# Patient Record
Sex: MALE | URBAN - METROPOLITAN AREA
[De-identification: names, ages, dates, MRNs, and addresses within clinical notes are randomized per-mention and may not be internally consistent; named-entity substitution may affect disease eponyms.]

---

## 2022-03-22 ENCOUNTER — APPOINTMENT (RX ONLY)
Dept: URBAN - METROPOLITAN AREA CLINIC 374 | Facility: CLINIC | Age: 42
Setting detail: DERMATOLOGY
End: 2022-03-22

## 2022-03-22 DIAGNOSIS — L82.1 OTHER SEBORRHEIC KERATOSIS: ICD-10-CM

## 2022-03-22 PROCEDURE — ? COUNSELING

## 2022-03-22 PROCEDURE — 99202 OFFICE O/P NEW SF 15 MIN: CPT

## 2022-03-22 PROCEDURE — ? ADDITIONAL NOTES

## 2022-03-22 ASSESSMENT — LOCATION SIMPLE DESCRIPTION DERM: LOCATION SIMPLE: LEFT CHEEK

## 2022-03-22 ASSESSMENT — LOCATION ZONE DERM: LOCATION ZONE: FACE

## 2022-03-22 ASSESSMENT — LOCATION DETAILED DESCRIPTION DERM: LOCATION DETAILED: LEFT SUPERIOR CENTRAL MALAR CHEEK

## 2024-05-13 ENCOUNTER — TELEPHONE (OUTPATIENT)
Dept: PSYCHIATRY | Facility: CLINIC | Age: 44
End: 2024-05-13

## 2024-05-13 NOTE — TELEPHONE ENCOUNTER
Spoke with patient in regards to med mgmt services. Patient was at [facility] but insurance is no longer accepted there. Writer sent a message to Chelsy to give patient a call.

## 2024-05-14 ENCOUNTER — TELEPHONE (OUTPATIENT)
Dept: PSYCHIATRY | Facility: CLINIC | Age: 44
End: 2024-05-14

## 2024-05-14 NOTE — TELEPHONE ENCOUNTER
"Behavioral Health Outpatient Intake Questions    Referred By   : SELF    Please advise interviewee that they need to answer all questions truthfully to allow for best care, and any misrepresentations of information may affect their ability to be seen at this clinic   => Was this discussed? Yes     If Minor Child (under age 18)    Who is/are the legal guardian(s) of the child?     Is there a custody agreement? No     If \"YES\"- Custody orders must be obtained prior to scheduling the first appointment  In addition, Consent to Treatment must be signed by all legal guardians prior to scheduling the first appointment    If \"NO\"- Consent to Treatment must be signed by all legal guardians prior to scheduling the first appointment    Behavioral Health Outpatient Intake History -     Presenting Problem (in patient's own words): Med triptap services.  [facility] no longer takes his insurance.    Are there any communication barriers for this patient?     No                                               If yes, please describe barriers:   If there is a unique situation, please refer to Danny Lincoln/Katie Carl for final determination.    Are you taking any psychiatric medications? Yes     If \"YES\" -What are they Zoloft, Seroquel, Klonopin     If \"YES\" -Who prescribes?     Has the Patient previously received outpatient Talk Therapy or Medication Management from [facility]  Yes        If \"YES\"- When, Where and with Whom? Was seen a E[facility] last year        If \"NO\" -Has Patient received these services elsewhere?       If \"YES\" -When, Where, and with Whom?    Has the Patient abused alcohol or other substances in the last 6 months ? No       If \"YES\" -What substance, How much, How often?     If illegal substance: Refer to [facility] (for GERI) or [facility].   If Alcohol in excess of 10 drinks per week:  Refer to [facility] (for GERI) or [facility]    Legal History-     Is this treatment court ordered? No   If \"yes \"send to " ":  Talk Therapy : Send to Danny Carl for final determination   Med Management: Send to Dr Ghosh for final determination     Has the Patient been convicted of a felony?  No   If \"Yes\" send to -When, What?  Talk Therapy : Send to Danny Carl for final determination   Med Management: Send to Dr Ghosh for final determination     ACCEPTED as a patient Yes  If \"Yes\" Appointment Date: 5/23/24     Referred Elsewhere? No  If “Yes” - (Where? Ex: [facility], [facility], [facility], [facility], etc.)      Name of Insurance Co:  [employer] care  Insurance ID#   [id number]   Insurance Phone #  If ins is primary or secondary?  If patient is a minor, parents information such as Name, D.O.B of guarantor.  "

## 2024-05-15 RX ORDER — CITALOPRAM 40 MG/1
40 TABLET ORAL DAILY
COMMUNITY
Start: 2024-03-20

## 2024-05-15 RX ORDER — QUETIAPINE FUMARATE 50 MG/1
50-100 TABLET, FILM COATED ORAL
COMMUNITY
Start: 2024-04-18

## 2024-05-15 RX ORDER — CLONAZEPAM 0.5 MG/1
TABLET ORAL
COMMUNITY
Start: 2024-04-11

## 2024-05-15 NOTE — PSYCH
"PSYCHIATRIC EVALUATION     [facility]    This note was not shared with the patient due to reasonable likelihood of causing patient harm         Name and Date of Birth:  Delmer Peterson 43 y.o. 1980    Date of Visit: 5/23/2024    Reason for visit: Establish care for medication management    HPI     Delmer is a 43 y.o. male with a history of depression and anxiety who presents for psychiatric evaluation due to  previous practice no longer taking his insurance . Currently being observed on Seroquel  mg as needed at bedtime, Klonopin 0.5 mg daily as needed, and Celexa 40 mg daily.  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  Currently not connected to any outpatient therapy, however is interested in being placed on the wait list at [facility].  No additional services at this time. Delmer was personally seen and evaluated today at the [facility] outpatient clinic for an initial psychiatric evaluation for medication management.        Delmer reports compliance with medications and denies any side effects at this time.He states he only takes the Klonopin as needed which is not often.  He reports typically taking 100 mg of the Seroquel at night. Reports he has been on the same medication regimen for approximately 8 to 10 years with good benefit. Delmer reports his mood as \"good\", but is a little anxious due to the appointment.  He states his sleep is great averaging 9 hours of sleep per night. He has been making sleep more of a priority. His energy levels and motivation are both adequate.  His appetite is good and he eats 3 meals per day. Denies any disordered eating.    Delmer endorses anxiety that started approximately at the age of 10 when his mom was institutionalized for mental illness. He started having panic attacks when he was drinking 18 years ago. At 24 years old he became sober and started seeing a " "psychiatrist. Today Delmer rates his anxiety a 3/10 on the severity scale. Feels his anxiety is mostly situational and well-controlled.He reports his last panic attack was approximately 2 years ago while giving blood as his panic seems to increase in medical settings.  He denies any panic attacks since then. He denies any suicidal thoughts, plan, or intent. Denies HI. Delmer denies any AH/VH. There are guns in the home but they are locked up. GAD_7 score 4    Delmer endorses depressive symptoms approximately starting around the age of 4 or 15 when he began to drink.  He reports his depression today is 0-1/10 on the severity scale.  He denies anhedonia.  Denies feelings of worthlessness, hopelessness, or guilt. Adamantly denies any SI.  Delmer denies aggression, mood swings, irritability, or any elevated moods.  He reports crying spells that occurred approximately last year around February when he had a \"breakdown\". Stating he was \"burning the candle at both ends\" with work and life. He was getting very little sleep which is why he now prioritizes his sleep.  He feels his anxiety and depression have been well-controlled. PHQ-9 score 2. Delmer was never formally diagnosed with ADHD and has no concerns at this time.  Denies any symptoms suggestive of OCD or PTSD. No medication changes at this time.    HPI ROS Appetite Changes and Sleep: normal sleep, adequate number of sleep hours, normal appetite, adequate appetite, normal energy level, adequate energy level    Psychiatric Review Of Systems:    Sleep changes: no  Appetite changes: no  Weight changes: no  Energy/anergy: no change, adequate  Interest/pleasure/anhedonia:  Still enjoys things  Somatic symptoms: no  Anxiety/panic: yes, in past.  Well-controlled today  Sanam: no  Guilty/hopeless: no  Self injurious behavior/risky behavior: no  Suicidal ideation: no  Homicidal ideation: no  Auditory hallucinations: no  Visual hallucinations: no  Other hallucinations: " "no  Delusional thinking: no  Eating disorder history: no  Obsessive/compulsive symptoms: no    Review Of Systems:    Mood Normal, Anxiety, and \"good\"   Behavior Normal    Thought Content Normal   General Normal    Personality Normal   Other Psych Symptoms Normal   Constitutional negative   ENT negative   Cardiovascular negative   Respiratory negative   Gastrointestinal negative   Genitourinary negative   Musculoskeletal negative   Integumentary negative   Neurological negative   Endocrine negative   Other Symptoms none     Allergies: KNDA     Past Surgical History:  Denies     Past Medical History:   Denies  Seizure history- No    Past Psychiatric History:   Past Inpatient Psychiatric Treatment:   No history of past inpatient psychiatric admissions  Past Outpatient Psychiatric Treatment:    [facility]  Therapy through [facility]  Past Suicide Attempts: no  Past Violent Behavior: no  Past Psychiatric Medication Trials: Zoloft and Xanax    Family Psychiatric History:   Mom- Bipolar     Family History:  History reviewed. No pertinent family history.    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      Social History     Substance and Sexual Activity   Drug Use Not on file     Social History     Socioeconomic History    Marital status: /Civil Union     Spouse name: Not on file    Number of children: Not on file    Years of education: Not on file    Highest education level: Not on file   Occupational History    Not on file   Tobacco Use    Smoking status: Not on file    Smokeless tobacco: Not on file   Substance and Sexual Activity    Alcohol use: Not on file    Drug use: Not on file    Sexual activity: Not on file   Other Topics Concern    Not on file   Social History Narrative    Not on file     Social Determinants of " "Health     Financial Resource Strain: Not on file   Food Insecurity: Not on file   Transportation Needs: Not on file   Physical Activity: Not on file   Stress: Not on file   Social Connections: Not on file   Intimate Partner Violence: Not on file   Housing Stability: Not on file     Social History     Social History Narrative    Not on file       Traumatic History:   Abuse: Denies  Other Traumatic Events: Denies    History Review:    The following portions of the patient's history were reviewed and updated as appropriate: allergies, current medications, past family history, past medical history, past social history, past surgical history, and problem list.     OBJECTIVE:     Mental Status Evaluation:    Appearance age appropriate, casually dressed, dressed appropriately, adequate grooming   Behavior normal, pleasant, cooperative, calm   Speech normal rate and volume   Mood normal, \"good\"   Affect normal range and intensity   Thought Processes organized, logical, coherent, goal directed   Associations intact associations   Thought Content normal   Perceptual Disturbances: none   Abnormal Thoughts  Risk Potential Suicidal ideation - None  Homicidal ideation - None  Potential for aggression - No   Orientation oriented to person, place, time/date, and situation   Memory recent and remote memory grossly intact   Cosciousness alert and awake   Attention Span attention span and concentration are age appropriate   Intellect Appears to be of Average Intelligence   Insight age appropriate and good   Judgement age appropriate and good   Muscle Strength and  Gait normal muscle strength and normal muscle tone, normal gait and normal balance   Language Normal   Fund of Knowledge Normal   Pain none   Pain Scale N/A       Laboratory Results: No results found for this or any previous visit.    Assessment/Plan:     Impression:  Anxiety  Depression, unspecified       Continue Seroquel 50 to 100 mg daily at bedtime as needed for " mood/sleep  Continue Klonopin 0.5 mg daily as needed for anxiety  Continue Celexa 40 mg daily for anxiety and depression  Interested in being placed on the outpatient therapy wait list for [facility].-Will collaborate with navigator  Medical follow up with PCP as needed  Aware of 24 hour and weekend coverage for urgent situations accessed by calling [facility] main practice number  Follow up in 3 months       Diagnoses:     Diagnoses and all orders for this visit:    Anxiety    Depression, unspecified depression type    Mood disorder (HCC)    Other orders  -     citalopram (CeleXA) 40 mg tablet; Take 40 mg by mouth daily  -     clonazePAM (KlonoPIN) 0.5 mg tablet; TAKE 1 TABLET BY ORAL ROUTE PER DAILY AS NEEDED  -     QUEtiapine (SEROquel) 50 mg tablet; Take  mg by mouth daily at bedtime as needed          Treatment Recommendations/Precautions:    Risks/Benefits      Risks, Benefits And Possible Side Effects Of Medications:    Risks, benefits, and possible side effects of medications explained to patient and patient verbalizes understanding and agreement for treatment.    Controlled Medication Discussion:     Delmer has been filling controlled prescriptions on time as prescribed according to [place] Prescription Drug Monitoring Program  Discussed with Delmer the risks of sedation, respiratory depression, impairment of ability to drive and potential for abuse and addiction related to treatment with benzodiazepine medications. He understands risk of treatment with benzodiazepine medications, agrees to not drive if feels impaired and agrees to take medications as prescribed    Treatment Plan: Treatment plan was completed during today's visit. Patient verbally consented and signed form while in the office today. Next treatment plan due 11/23/2024.     Visit Time     Visit Start Time: 09:00 AM  Visit Stop Time: 09:20 AM  Total Visit Duration: 20 minutes    Tiff Ledesma  GUIDO Lovell  05/23/24

## 2024-05-16 NOTE — BH TREATMENT PLAN
TREATMENT PLAN (Medication Management Only)        [facility]    Name and Date of Birth:  Delmer Peterson 43 y.o. 1980  Date of Treatment Plan: May 23, 2024  Diagnosis/Diagnoses:  No diagnosis found.  Strengths/Personal Resources for Self-Care: taking medications as prescribed, ability to adapt to life changes, ability to communicate needs, ability to communicate well, ability to listen, ability to reason, ability to understand psychiatric illness.  Area/Areas of need (in own words): anxiety, depression  1. Long Term Goal: maintain control of depression.  Target Date: 11/23/2024  Person/Persons responsible for completion of goal: Delmer  2.  Short Term Objective (s) - How will we reach this goal?:   A. Provider new recommended medication/dosage changes and/or continue medication(s): continue current medications as prescribed.  B. Keep regularly scheduled psychiatric appointments  C. Maintain adherence to psychotropic medication regimen   D. Exercise daily (at least 30 mins)  E. Maintain appropriate dietary intake  F. Practice adequate sleep hygiene    Target Date: 11/23/2024  Person/Persons Responsible for Completion of Goal: Delmer  Progress Towards Goals: continuing treatment  Treatment Modality: medication management every 4 weeks  Review due 180 days from date of this plan:  11/23/2024  Expected length of service: ongoing treatment  My Physician/PA/NP and I have developed this plan together and I agree to work on the goals and objectives. I understand the treatment goals that were developed for my treatment.

## 2024-05-23 ENCOUNTER — OFFICE VISIT (OUTPATIENT)
Dept: PSYCHIATRY | Facility: CLINIC | Age: 44
End: 2024-05-23
Payer: COMMERCIAL

## 2024-05-23 DIAGNOSIS — F41.9 ANXIETY: Primary | ICD-10-CM

## 2024-05-23 DIAGNOSIS — F32.A DEPRESSION, UNSPECIFIED DEPRESSION TYPE: ICD-10-CM

## 2024-05-23 DIAGNOSIS — F39 MOOD DISORDER (HCC): ICD-10-CM

## 2024-05-23 PROCEDURE — 90792 PSYCH DIAG EVAL W/MED SRVCS: CPT

## 2024-05-30 ENCOUNTER — TELEPHONE (OUTPATIENT)
Dept: PSYCHIATRY | Facility: CLINIC | Age: 44
End: 2024-05-30

## 2024-07-03 ENCOUNTER — TELEPHONE (OUTPATIENT)
Dept: PSYCHIATRY | Facility: CLINIC | Age: 44
End: 2024-07-03

## 2024-07-03 DIAGNOSIS — F41.9 ANXIETY: Primary | ICD-10-CM

## 2024-07-03 RX ORDER — CLONAZEPAM 0.5 MG/1
0.5 TABLET ORAL DAILY PRN
Qty: 30 TABLET | Refills: 0 | Status: SHIPPED | OUTPATIENT
Start: 2024-07-03

## 2024-07-03 NOTE — TELEPHONE ENCOUNTER
Pt calling for RF of clonazepam. Last fill per PDMP: 4/11. No current active script on file. Forwarding to provider for review. Patient scheduled for appt on 8/22.

## 2024-07-03 NOTE — PROGRESS NOTES
Delmer called the clinic today and appropriately requested refill of controlled psychotropic medications Klonopin 0.5 mg . Review of PDMP website reveals no concerns for abuse or misuse. As such, will refill script today for 30-days.

## 2024-07-19 DIAGNOSIS — F41.9 ANXIETY: ICD-10-CM

## 2024-07-19 DIAGNOSIS — F32.A DEPRESSION, UNSPECIFIED DEPRESSION TYPE: Primary | ICD-10-CM

## 2024-07-19 NOTE — TELEPHONE ENCOUNTER
Reason for call:   [x] Refill   [] Prior Auth  [] Other:     Office:   [] PCP/Provider -   [x] Specialty/Provider - Tiff Pritchard    Medication: Quetiapine      Dose/Frequency: 50 mg 1 - 2 tabs HS PRN    Quantity: 60    Ph[facility]acy: [facility] [place] [facility]     Does the patient have enough for 3 days?   [] Yes   [x] No - Send as HP to POD Patient would like to  tomorrow going on vacation and dose not have medication

## 2024-07-24 RX ORDER — QUETIAPINE FUMARATE 50 MG/1
50-100 TABLET, FILM COATED ORAL
Qty: 60 TABLET | Refills: 0 | Status: SHIPPED | OUTPATIENT
Start: 2024-07-24 | End: 2024-08-23

## 2024-07-29 ENCOUNTER — TELEPHONE (OUTPATIENT)
Age: 44
End: 2024-07-29

## 2024-07-29 NOTE — TELEPHONE ENCOUNTER
Contacted patient off of Talk Therapy  to verify needs of services in attempts to offer patient an appointment. spoke with patient whom stated he is no longer interested in talk therapy. Pt is aware he will be removed off the wait list.

## 2024-08-07 DIAGNOSIS — F41.9 ANXIETY: ICD-10-CM

## 2024-08-07 PROCEDURE — NC001 PR NO CHARGE

## 2024-08-07 NOTE — TELEPHONE ENCOUNTER
Reason for call:   [x] Refill   [] Prior Auth  [] Other:     Office:   [] PCP/Provider -   [x] Specialty/Provider - Psych: GUIDO Pringle    Medication: clonazePAM (KlonoPIN) 0.5 mg     Dose/Frequency: Take 1 tablet (0.5 mg total) by mouth daily as needed     Quantity: 30    Pharmacy: [facility]/pharmacy #[id number] - [place], [place] - [address]     Does the patient have enough for 3 days?   [] Yes   [x] No - Send as HP to POD

## 2024-08-08 RX ORDER — CLONAZEPAM 0.5 MG/1
0.5 TABLET ORAL DAILY PRN
Qty: 30 TABLET | Refills: 0 | Status: SHIPPED | OUTPATIENT
Start: 2024-08-08

## 2024-08-13 NOTE — PSYCH
"PSYCHIATRIC EV[facility]UATION     [facility]    This note was not shared with the patient due to reasonable likelihood of causing patient harm         Name and Date of Birth:  Delmer Peterson 43 y.o. 1980    Date of Visit: 8/22/2024    Reason for visit: Follow-up for medication management    Subjective:  Medication compliance: yes  Medication side effects: Denies    HPI   Delmer is a 43 y.o. male with a history of depression and anxiety who presents for follow-up for medication management. Patient has psychiatric diagnoses including anxiety and depression. Currently being observed on Seroquel  mg as needed at bedtime, Klonopin 0.5 mg daily as needed, and Celexa 40 mg daily.  Delmer is currently on the wait list for outpatient therapy at [facility].  No additional services at this time.     Delmer reports compliance with medications and denies any side effects at this time.  He states he has been doing well and his mood is \"fine\".  Sleep and appetite remain adequate.  Energy levels and motivation remain good.  Delmer adamantly denies any suicidal thoughts, plan, or intent.  He denies HI, AH, VH.  Delmer does not endorse any aggression, irritability or mood swings.  Denies any periods of [place] or elevated moods.  Delmer denies any depression today and rates his anxiety a 5/10 on the severity scale with 10 being the most severe.  He states this rating is based on stress from work and his 4 kids all having birthdays all in the same month.  He also stated his wife is having a hard time and is currently seeing a therapist.  He feels the symptoms have been well-controlled.  No medication changes at this time    HPI ROS Appetite Changes and Sleep: normal sleep, adequate number of sleep hours, normal appetite, adequate appetite, normal energy level, adequate energy level    Psychiatric Review Of Systems:    Sleep changes: no  Appetite changes: no  Weight changes: " "no  Energy/anergy: no change, adequate  Interest/pleasure/anhedonia:  Still enjoys things  Somatic symptoms: no  Anxiety/panic: yes, in past.  Well-controlled today  Sanam: no  Guilty/hopeless: no  Self injurious behavior/risky behavior: no  Suicidal ideation: no  Homicidal ideation: no  Auditory hallucinations: no  Visual hallucinations: no  Other hallucinations: no  Delusional thinking: no  Eating disorder history: no  Obsessive/compulsive symptoms: no    Review Of Systems:    Mood Anxiety, \"fine\"   Behavior Normal    Thought Content Normal   General Normal    Personality Normal   Other Psych Symptoms Normal   Constitutional negative   ENT negative   Cardiovascular negative   Respiratory negative   Gastrointestinal negative   Genitourinary negative   Musculoskeletal negative   Integumentary negative   Neurological negative   Endocrine negative   Other Symptoms none     Allergies:   NKDA     Past Surgical History:  Denies     Past Medical History:   Denies  Seizure history- No    Past Psychiatric History:   Past Inpatient Psychiatric Treatment:   No history of past inpatient psychiatric admissions  Past Outpatient Psychiatric Treatment:    [facility]  Therapy through [facility]  Past Suicide Attempts: no  Past Violent Behavior: no  Past Psychiatric Medication Trials: Zoloft and Xanax    Family Psychiatric History:   Mom- Bipolar     Family History:  History reviewed. No pertinent family history.    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      Social History     Substance and Sexual Activity   Drug Use Not on file     Social History     Socioeconomic History    Marital status: /Civil Union     Spouse name: Not on file    Number of children: Not on file    Years of education: Not on file    Highest education " "level: Not on file   Occupational History    Not on file   Tobacco Use    Smoking status: Never    Smokeless tobacco: Never   Substance and Sexual Activity    Alcohol use: Not on file    Drug use: Not on file    Sexual activity: Not on file   Other Topics Concern    Not on file   Social History Narrative    Not on file     Social Determinants of Health     Financial Resource Strain: Not on file   Food Insecurity: Not on file   Transportation Needs: Not on file   Physical Activity: Not on file   Stress: Not on file   Social Connections: Not on file   Intimate Partner Violence: Not on file   Housing Stability: Not on file     Social History     Social History Narrative    Not on file       Traumatic History:   Abuse: Denies  Other Traumatic Events: Denies    History Review:    The following portions of the patient's history were reviewed and updated as appropriate: allergies, current medications, past family history, past medical history, past social history, past surgical history, and problem list.     OBJECTIVE:     Mental Status Evaluation:    Appearance age appropriate, casually dressed, dressed appropriately, adequate grooming   Behavior normal, pleasant, cooperative, calm   Speech normal rate and volume   Mood \"Fine\"   Affect normal range and intensity   Thought Processes organized, logical, coherent, goal directed   Associations intact associations   Thought Content normal   Perceptual Disturbances: none   Abnormal Thoughts  Risk Potential Suicidal ideation - None  Homicidal ideation - None  Potential for aggression - No   Orientation oriented to person, place, time/date, and situation   Memory recent and remote memory grossly intact   Cosciousness alert and awake   Attention Span attention span and concentration are age appropriate   Intellect Appears to be of Average Intelligence   Insight age appropriate and good   Judgement age appropriate and good   Muscle Strength and  Gait normal muscle strength and " normal muscle tone, normal gait and normal balance   Language Normal   Fund of Knowledge Normal   Pain none   Pain Scale N/A       Laboratory Results: No results found for this or any previous visit.    Assessment/Plan:     Impression:  Anxiety  Depression, unspecified       Continue Seroquel 50 to 100 mg daily at bedtime as needed for mood/sleep  Continue Klonopin 0.5 mg daily as needed for anxiety  Continue Celexa 40 mg daily for anxiety and depression  Recommended outpatient therapy-currently on the wait list at [facility]  Medical follow up with PCP as needed  Aware of 24 hour and weekend coverage for urgent situations accessed by calling [facility] main practice number  Follow up in 3 months       Diagnoses:     Diagnoses and all orders for this visit:    Depression, unspecified depression type  -     QUEtiapine (SEROquel) 50 mg tablet; Take 1-2 tablets ( mg total) by mouth daily at bedtime as needed (Daily at bedtimne as needed)    Anxiety  -     QUEtiapine (SEROquel) 50 mg tablet; Take 1-2 tablets ( mg total) by mouth daily at bedtime as needed (Daily at bedtimne as needed)        Treatment Recommendations/Precautions:    Risks/Benefits      Risks, Benefits And Possible Side Effects Of Medications:    Risks, benefits, and possible side effects of medications explained to patient and patient verbalizes understanding and agreement for treatment.    Controlled Medication Discussion:     Delmer has been filling controlled prescriptions on time as prescribed according to [place] Prescription Drug Monitoring Program  Discussed with Delmer the risks of sedation, respiratory depression, impairment of ability to drive and potential for abuse and addiction related to treatment with benzodiazepine medications. He understands risk of treatment with benzodiazepine medications, agrees to not drive if feels impaired and agrees to take medications as  prescribed    Treatment Plan Next treatment plan due 11/23/2024.     Visit Time     Visit Start Time: 8:55 AM  Visit Stop Time: 9: 05 AM  Total Visit Duration: 10 minutes    GUIDO Carter  08/22/24

## 2024-08-22 ENCOUNTER — OFFICE VISIT (OUTPATIENT)
Dept: PSYCHIATRY | Facility: CLINIC | Age: 44
End: 2024-08-22
Payer: COMMERCIAL

## 2024-08-22 DIAGNOSIS — F41.9 ANXIETY: ICD-10-CM

## 2024-08-22 DIAGNOSIS — F32.A DEPRESSION, UNSPECIFIED DEPRESSION TYPE: ICD-10-CM

## 2024-08-22 PROCEDURE — 99212 OFFICE O/P EST SF 10 MIN: CPT

## 2024-08-22 RX ORDER — QUETIAPINE FUMARATE 50 MG/1
50-100 TABLET, FILM COATED ORAL
Qty: 120 TABLET | Refills: 0 | Status: SHIPPED | OUTPATIENT
Start: 2024-08-22 | End: 2024-11-20

## 2024-08-22 NOTE — BH CRISIS PLAN
Client Name: Delmer Peterson       Client YOB: 1980    Tom Safety Plan      Creation Date: 8/22/24    Created By: GUIDO Carter       Step 1: Warning Signs:   Warning Signs   pull back introverted   stay in house            Step 2: Internal Coping Strategies:   Internal Coping Strategies   pray   breathe            Step 3: People and social settings that provide distraction:   Name Contact Information   wife in cell phone   program friends in cell phone            Step 4: People whom I can ask for help during a crisis:      Name Contact Information    wife in cell phone      Step 5: Professionals or agencies I can contact during a crisis:      Clinican/Agency Name Phone Emergency Contact    Jad Garces  [phone]      Local Emergency Department Emergency Department Phone Emergency Department Address    911          Crisis Phone Numbers:   Suicide Prevention Lifeline: Call or Text  298 Crisis Text Line: Text HOME to 772-237   Please note: Some [facility] do not have a separate number for Child/Adolescent specific crisis. If your county is not listed under Child/Adolescent, please call the adult number for your county      Adult Crisis Numbers: Child/Adolescent Crisis Numbers   [facility]: [phone] [facility]: [phone]   [facility]: [phone] [facility]: [phone]   [facility]: [phone] [place]: [phone]   [facility]: [phone] [place]: [phone]   On license of [facility]: [phone]   [facility]: [phone]   [facility]: [phone]   [facility]: [phone] (daytime) [phone] (after hours, weekends, holidays)      Step 6: Making the environment safer (plan for lethal means safety):      Optional: What is most important to me and worth living for?   Family     Tom Safety Plan. Daksha Mathews and Reid Woodruff. Used with permission of the  authors.

## 2024-09-26 ENCOUNTER — TELEPHONE (OUTPATIENT)
Dept: PSYCHIATRY | Facility: CLINIC | Age: 44
End: 2024-09-26

## 2024-09-26 NOTE — TELEPHONE ENCOUNTER
Writer left voicemail for client in regard to needing to reschedule 11/22/24 appointment at 2 pm due to provider schedule change. Client advised to call back to reschedule.

## 2024-10-08 DIAGNOSIS — F41.9 ANXIETY: ICD-10-CM

## 2024-10-08 NOTE — TELEPHONE ENCOUNTER
Reason for call:   [x] Refill   [] Prior Auth  [x] Other: pt would also like a call back from the office to reschedule his appt that was canceled    Office:   [] PCP/Provider -   [x] Specialty/Provider - psychiatry     Medication: clonazepam     Dose/Frequency: 0.5 mg take one daily as needed    Quantity: 30    Pharmacy: [facility] [address]    Does the patient have enough for 3 days?   [x] Yes   [] No - Send as HP to POD     ELOPED

## 2024-10-08 NOTE — TELEPHONE ENCOUNTER
Refill cannot be delegated    1 [id number] 08/08/2024 08/08/2024 clonazePAM (Tablet) 30.0 30 0.5 MG NA ADRIEL HUYNH [facility] Commercial Insurance 0 / 0 [place]   1 [id number] 07/03/2024 07/03/2024 clonazePAM (Tablet) 30.0 30 0.5 MG NA JUAN RAMON STAPLES [facility] Commercial [place] 0 / 0 [place]   1 [id number] 04/11/2024 01/19/2024 clonazePAM (Tablet) 30.0 30 0.5 MG [facility] [place] 2 / 2 [place]   1 [id number] 02/29/2024 01/19/2024 clonazePAM (Tablet) 30.0 30 0.5 MG NA [facility] Medicaid 1 / 2 [place]   1 [id number] 01/19/2024 01/19/2024 clonazePAM (Tablet) 30.0 30 0.5 MG [facility] [place] 0 / 2 [place]   1 [id number] 12/04/2023 12/04/2023 clonazePAM (Tablet) 30.0 30 0.5 MG NA SHAHZAD AQUINO [facility] [place] 0 / 1 [place]

## 2024-10-09 RX ORDER — CLONAZEPAM 0.5 MG/1
0.5 TABLET ORAL DAILY PRN
Qty: 30 TABLET | Refills: 0 | Status: SHIPPED | OUTPATIENT
Start: 2024-10-09

## 2024-10-18 ENCOUNTER — TELEPHONE (OUTPATIENT)
Dept: PSYCHIATRY | Facility: CLINIC | Age: 44
End: 2024-10-18

## 2024-10-18 NOTE — TELEPHONE ENCOUNTER
Writer called and left voicemail to schedule client's appointment with Tiff Lovell that was cancelled due to her schedule change.

## 2024-10-27 DIAGNOSIS — F32.A DEPRESSION, UNSPECIFIED DEPRESSION TYPE: ICD-10-CM

## 2024-10-27 DIAGNOSIS — F41.9 ANXIETY: ICD-10-CM

## 2024-10-27 NOTE — TELEPHONE ENCOUNTER
Medication Refill Request     Name Seroquel   Dose/Frequency 50-100mg  Quantity 120  Verified pharmacy   [x]  Verified ordering Provider   [x]  Does patient have enough for the next 3 days? Yes [] No [x]

## 2024-10-28 RX ORDER — QUETIAPINE FUMARATE 50 MG/1
50-100 TABLET, FILM COATED ORAL
Qty: 120 TABLET | Refills: 0 | Status: SHIPPED | OUTPATIENT
Start: 2024-10-28 | End: 2025-01-26

## 2024-11-22 NOTE — PSYCH
Psychiatric Medication Management - Behavioral Health   Delmer Peterson 44 y.o. male MRN: [id number]    This note was not shared with the patient due to reasonable likelihood of causing patient harm       VIRTUAL CARE DOCUMENTATION:     1. This service was provided via Telemedicine using Other: Epic Embedded      2. Parties in the room with patient during teleconsult Patient only    3. Confidentiality My office door was closed     4. Participants No one else was in the room    5. Patient acknowledged consent and understanding of privacy and security of the  Telemedicine consult. I informed the patient that I have reviewed their record in Epic and presented the opportunity for them to ask any questions regarding the visit today.  The patient agreed to participate.    6. Time spent 10 minutes       Reason for Visit: Follow up for medication management    Date of visit: 12/2/2024    Assessment & Plan  Anxiety    Orders:    QUEtiapine (SEROquel) 50 mg tablet; Take 1-2 tablets ( mg total) by mouth daily at bedtime as needed (Daily at bedtimne as needed)    clonazePAM (KlonoPIN) 0.5 mg tablet; Take 1 tablet (0.5 mg total) by mouth daily as needed for anxiety    citalopram (CeleXA) 40 mg tablet; Take 1 tablet (40 mg total) by mouth daily    Depression, unspecified depression type    Orders:    QUEtiapine (SEROquel) 50 mg tablet; Take 1-2 tablets ( mg total) by mouth daily at bedtime as needed (Daily at bedtimne as needed)    citalopram (CeleXA) 40 mg tablet; Take 1 tablet (40 mg total) by mouth daily         Assessment/Plan:     Impression:  Anxiety  Depression, unspecified     Continue Seroquel 50 to 100 mg daily at bedtime as needed for mood/sleep  Continue Klonopin 0.5 mg daily as needed for anxiety- PDMP reviewed  Continue Celexa 40 mg daily for anxiety and depression  Recommended outpatient therapy-currently on the wait list at [facility]  Medical follow up with PCP as needed  Aware of 24 hour and  weekend coverage for urgent situations accessed by calling [facility] Outpatient main practice number  Follow up in 3 months      Treatment Plan: Both a treatment plan and a crisis plan were completed at today's visit. Patient verbally consented and signed both forms while in the office today. Next treatment plan due 6/2/2025. Next crisis plan due 12/2/2025.     Treatment Recommendations:      Risks, Benefits And Possible Side Effects Of Medications:  Risks, benefits, and possible side effects of medications explained to patient and family, they verbalize understanding and Reviewed risks/benefits and side effects of antidepressant medications including black box warning on antidepressants, patient and family verbalize understanding.    Controlled Medication Discussion: Discussed with patient the risks of sedation, respiratory depression, impairment of ability to drive and potential for abuse and addiction related to treatment with benzodiazepine medications. The patient understands risk of treatment with benzodiazepine medications, agrees to not drive if feels impaired and agrees to take medications as prescribed. and The patient has been filling controlled prescriptions on time as prescribed to [place] Prescription Drug Monitoring program.        Subjective:  Medication compliance: yes  Medication side effects: Denies    HPI   Delmer is a 43 y.o. male with a history of depression and anxiety who presents for follow-up for medication management. Patient has psychiatric diagnoses including anxiety and depression. Currently being observed on Seroquel  mg as needed at bedtime, Klonopin 0.5 mg daily as needed, and Celexa 40 mg daily.  Delmer is currently on the wait list for outpatient therapy at [facility].  No additional services at this time.     Delmer reports medication compliance and denies any side effects at this time.  He states they had a nice local Thanksgiving per  "family.  He states his mood is \"good\".  Sleep and appetite remain adequate.  Energy levels and motivation remain pretty good.  Delmer adamantly denies any suicidal thoughts, plan, or intent.  Denies HI, AH, or VH.  He does not endorse any irritability, aggression, or mood swings.  Delmer denies any frequent crying spells or any periods of elevated moods.  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  He plans on having a local, quiet [place].  No other concerns at this time.  No medication changes at this time    Review Of Systems:     Constitutional Negative   ENT Negative   Cardiovascular Negative   Respiratory Negative   Gastrointestinal Negative   Genitourinary Negative   Musculoskeletal Negative   Integumentary Negative   Neurological Negative   Endocrine Negative     Past Medical History:   Patient Active Problem List   Diagnosis    Anxiety    Depression   Seizure history- No     Allergies:   No Known Allergies    Past Surgical History:   History reviewed. No pertinent surgical history.    Past Psychiatric History:   Past Inpatient Psychiatric Treatment:   No history of past inpatient psychiatric admissions  Past Outpatient Psychiatric Treatment:    [facility]  Past Suicide Attempts: no  Past Violent Behavior: no  Past Psychiatric Medication Trials: Zoloft and Xanax    Family Psychiatric History:   Mom- Bipolar     Social History:   Lives with- Wife- 4 kids    Occupation- [employer]   Hobbies- Photography, fly fishing                                 Still enjoy (Anhedonia)- Still enjoys    Substance Abuse History:   Denies use of nicotine, tobacco, caffeine, marijuana, or other illicit drugs.     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    The following portions of the patient's history were reviewed and updated as appropriate: allergies, current medications, past family history, past medical history, past social history, past surgical history, and problem list.    Objective:  There were no vitals filed for this visit.      Weight (last 2 days)       None            Labs:   No results found for this or any previous visit.    Mental status:  Appearance sitting comfortably in chair, dressed in casual clothing, cooperative with interview, good eye contact   Mood \"Good\"    Affect Appears generally euthymic, stable, mood-congruent   Speech Normal rate, rhythm, and volume   Thought Processes Linear and goal directed   Associations intact associations   Hallucinations Denies any auditory or visual hallucinations   Thought Content No passive or active suicidal or homicidal ideation, intent, or plan.   Orientation Oriented to person, place, time, and situation   Recent and Remote Memory Grossly intact   Attention Span and Concentration Concentration intact   Intellect Appears to be of Average Intelligence   Insight Insight intact   Judgement judgment was intact   Muscle Strength Unable to assess due to virtual visit   Language Within normal limits   Fund of Knowledge Age appropriate   Pain None     PHQ-A Depression Screening                     Visit Time    Visit Start Time: 1:57 PM  Visit Stop Time: 2:07 PM  Total Visit Duration:  10 minutes    This note may have been written with the assistance of dictation software. Please excuse any grammatical  errors, misspellings,  and abnormal spacing of letters, sentences or paragraphs .     GUIDO Carter  12/02/24      "

## 2024-11-22 NOTE — BH TREATMENT PLAN
TREATMENT PLAN (Medication Management Only)        [facility]    Name and Date of Birth:  Delmer Peterson 44 y.o. 1980  Date of Treatment Plan: 12/2/2024  Diagnosis/Diagnoses:  anxiety and depression   Strengths/Personal Resources for Self-Care: supportive family, taking medications as prescribed, ability to adapt to life changes, ability to communicate needs, ability to communicate well, ability to listen, ability to reason, ability to understand psychiatric illness, average or above intelligence.  Area/Areas of need (in own words): anxiety, depression  1. Long Term Goal: maintain control of depression.  Target Date: 6/2/2025  Person/Persons responsible for completion of goal: Delmer  2.  Short Term Objective (s) - How will we reach this goal?:   A. Provider new recommended medication/dosage changes and/or continue medication(s): continue current medications as prescribed.  B.  Maintain medication compliance .  C.  Attend all follow-up appointments as scheduled .  Target Date: 6/2/2025  Person/Persons Responsible for Completion of Goal: Delmer  Progress Towards Goals: continuing treatment  Treatment Modality: medication management every 3 months  Review due 180 days from date of this plan:  6/2/2025  Expected length of service: ongoing treatment  My Physician/PA/NP and I have developed this plan together and I agree to work on the goals and objectives. I understand the treatment goals that were developed for my treatment.

## 2024-12-02 ENCOUNTER — TELEMEDICINE (OUTPATIENT)
Dept: PSYCHIATRY | Facility: CLINIC | Age: 44
End: 2024-12-02
Payer: COMMERCIAL

## 2024-12-02 ENCOUNTER — TELEPHONE (OUTPATIENT)
Dept: PSYCHIATRY | Facility: CLINIC | Age: 44
End: 2024-12-02

## 2024-12-02 DIAGNOSIS — F41.9 ANXIETY: Primary | ICD-10-CM

## 2024-12-02 DIAGNOSIS — F32.A DEPRESSION, UNSPECIFIED DEPRESSION TYPE: ICD-10-CM

## 2024-12-02 PROCEDURE — 99212 OFFICE O/P EST SF 10 MIN: CPT

## 2024-12-02 RX ORDER — CITALOPRAM HYDROBROMIDE 40 MG/1
40 TABLET ORAL DAILY
Qty: 90 TABLET | Refills: 0 | Status: SHIPPED | OUTPATIENT
Start: 2024-12-02

## 2024-12-02 RX ORDER — QUETIAPINE FUMARATE 50 MG/1
50-100 TABLET, FILM COATED ORAL
Qty: 180 TABLET | Refills: 0 | Status: SHIPPED | OUTPATIENT
Start: 2024-12-02 | End: 2025-03-02

## 2024-12-02 RX ORDER — CLONAZEPAM 0.5 MG/1
0.5 TABLET ORAL DAILY PRN
Qty: 30 TABLET | Refills: 2 | Status: SHIPPED | OUTPATIENT
Start: 2024-12-02

## 2024-12-02 NOTE — ASSESSMENT & PLAN NOTE
Orders:    QUEtiapine (SEROquel) 50 mg tablet; Take 1-2 tablets ( mg total) by mouth daily at bedtime as needed (Daily at bedtimne as needed)    clonazePAM (KlonoPIN) 0.5 mg tablet; Take 1 tablet (0.5 mg total) by mouth daily as needed for anxiety    citalopram (CeleXA) 40 mg tablet; Take 1 tablet (40 mg total) by mouth daily

## 2024-12-02 NOTE — BH CRISIS PLAN
Client Name: Delmer Peterson       Client YOB: 1980    Tom Safety Plan      Creation Date: 12/2/24    Created By: GUIDO Carter       Step 1: Warning Signs:   Warning Signs   pull back introverted   stay in house            Step 2: Internal Coping Strategies:   Internal Coping Strategies   pray   breathe            Step 3: People and social settings that provide distraction:   Name Contact Information   wife in cell phone   program friends in cell phone            Step 4: People whom I can ask for help during a crisis:      Name Contact Information    wife in cell phone      Step 5: Professionals or agencies I can contact during a crisis:      Clinican/Agency Name Phone Emergency Contact    Jad Garces  [phone]      Local Emergency Department Emergency Department Phone Emergency Department Address    911          Crisis Phone Numbers:   Suicide Prevention Lifeline: Call or Text  736 Crisis Text Line: Text HOME to 718-515   Please note: Some [facility] do not have a separate number for Child/Adolescent specific crisis. If your county is not listed under Child/Adolescent, please call the adult number for your county      Adult Crisis Numbers: Child/Adolescent Crisis Numbers   [facility]: [phone] [facility]: [phone]   [facility]: [phone] [facility]: [phone]   [facility]: [phone] [place]: [phone]   [facility]: [phone] [place]: [phone]   Critical access hospital/[facility]: [phone]   [facility]: [phone]   [facility]: [phone]   [facility]: [phone] (daytime) [phone] (after hours, weekends, holidays)      Step 6: Making the environment safer (plan for lethal means safety):      Optional: What is most important to me and worth living for?   Family     Tom Safety Plan. Daksha Mathews and Reid Woodruff. Used with permission of the  authors.

## 2024-12-02 NOTE — ASSESSMENT & PLAN NOTE
Orders:    QUEtiapine (SEROquel) 50 mg tablet; Take 1-2 tablets ( mg total) by mouth daily at bedtime as needed (Daily at bedtimne as needed)    citalopram (CeleXA) 40 mg tablet; Take 1 tablet (40 mg total) by mouth daily

## 2024-12-03 ENCOUNTER — TELEPHONE (OUTPATIENT)
Age: 44
End: 2024-12-03

## 2024-12-03 NOTE — TELEPHONE ENCOUNTER
Patient contacted the office to schedule a follow up visit with provider. Patient is now scheduled for 3/3  at 4 virtually.

## 2025-01-23 DIAGNOSIS — F32.A DEPRESSION, UNSPECIFIED DEPRESSION TYPE: ICD-10-CM

## 2025-01-23 DIAGNOSIS — F41.9 ANXIETY: ICD-10-CM

## 2025-01-24 RX ORDER — QUETIAPINE FUMARATE 50 MG/1
TABLET, FILM COATED ORAL
Qty: 180 TABLET | Refills: 0 | OUTPATIENT
Start: 2025-01-24

## 2025-01-24 RX ORDER — CITALOPRAM HYDROBROMIDE 40 MG/1
40 TABLET ORAL DAILY
Qty: 90 TABLET | Refills: 0 | Status: SHIPPED | OUTPATIENT
Start: 2025-01-24

## 2025-02-24 NOTE — PSYCH
Psychiatric Medication Management - Behavioral Health   Delmer Peterson 44 y.o. male MRN: [id number]    This note was not shared with the patient due to reasonable likelihood of causing patient harm       Administrative Statements     Encounter provider GUIDO Carter    The Patient is located at Other, [facility] and in the following state in which I hold an active license [place].    The patient was identified by name and date of birth. Delmer Peterson was informed that this is a telemedicine visit and that the visit is being conducted through the Epic Embedded platform. He agrees to proceed..  My office door was closed. No one else was in the room.  He acknowledged consent and understanding of privacy and security of the video platform. The patient has agreed to participate and understands they can discontinue the visit at any time.    I have spent a total time of 10 minutes in caring for this patient on the day of the visit/encounter including Risks and benefits of tx options, Instructions for management, Patient and family education, Importance of tx compliance, Documenting in the medical record, Reviewing/placing orders in the medical record (including tests, medications, and/or procedures), and Obtaining or reviewing history  .     Reason for Visit: Follow up for medication management    Date of visit: 3/3/2025    Assessment & Plan  Anxiety   -stable- continue medications as prescribed  Orders:    QUEtiapine (SEROquel) 50 mg tablet; Take 1-2 tablets ( mg total) by mouth daily at bedtime as needed (Daily at bedtimne as needed)    clonazePAM (KlonoPIN) 0.5 mg tablet; Take 1 tablet (0.5 mg total) by mouth daily as needed for anxiety    citalopram (CeleXA) 40 mg tablet; Take 1 tablet (40 mg total) by mouth daily    Depression, unspecified depression type   -stable- continue medications as prescribed  Orders:    QUEtiapine (SEROquel) 50 mg tablet; Take 1-2 tablets ( mg total) by mouth daily at bedtime as needed  The following letter pertains to your most recent diagnostic tests:    Good news! Your chest x-ray is normal.  If your cough is really severe, you may want to schedule an appointment with a provider in our office or go to our urgent care prior to your departure to [place].      Sincerely,    Dr. Cruz (Daily at bedtimne as needed)    citalopram (CeleXA) 40 mg tablet; Take 1 tablet (40 mg total) by mouth daily         Assessment/Plan:     Impression:  Anxiety  Depression, unspecified     Continue Seroquel 50 to 100 mg daily at bedtime as needed for mood/sleep  Continue Klonopin 0.5 mg daily as needed for anxiety- PDMP reviewed  Continue Celexa 40 mg daily for anxiety and depression  Will be getting labs through his PCP   Recommended outpatient therapy-currently on the wait list at [facility]  Medical follow up with PCP as needed  Aware of 24 hour and weekend coverage for urgent situations accessed by calling [facility] Outpatient main practice number  Follow up in 3 months      Treatment Plan: Next treatment plan due 6/2/2025. Next crisis plan due 12/2/2025.     Treatment Recommendations:      Risks, Benefits And Possible Side Effects Of Medications:  Risks, benefits, and possible side effects of medications explained to patient and family, they verbalize understanding and Reviewed risks/benefits and side effects of antidepressant medications including black box warning on antidepressants, patient and family verbalize understanding.    Controlled Medication Discussion: Discussed with patient the risks of sedation, respiratory depression, impairment of ability to drive and potential for abuse and addiction related to treatment with benzodiazepine medications. The patient understands risk of treatment with benzodiazepine medications, agrees to not drive if feels impaired and agrees to take medications as prescribed. and The patient has been filling controlled prescriptions on time as prescribed to [place] Prescription Drug Monitoring program.        Subjective:  Medication compliance: yes  Medication side effects: Denies    DALE   Delmer is a 44 y.o. male with a history of depression and anxiety who presents for follow-up for medication management. Patient has psychiatric  "diagnoses including anxiety and depression. Currently being observed on Seroquel 100 mg as needed at bedtime, Klonopin 0.5 mg daily as needed, and Celexa 40 mg daily.  Delmer is currently on the wait list for outpatient therapy at [facility].  No additional services at this time.     Delmer reports medication compliance and denies any side effects at this time.  He states his whole family is just getting back to normal after all getting the flu.  He states his mood is \"good\".  Sleep and appetite remain adequate.  Energy levels and motivation are okay.  He reports he has some big things coming up for work and he will be going out of town.  Delmer denies SI, HI, AH, or VH.  He does not endorse any mood swings or irritability.  He denies any periods of elevated mood or [place].  He reports mild anxiety which she states is manageable.  He does not endorse any depression today.  Discussed with Delmer obtaining an A1c and lipid panel.  He reports he gets yearly lab work through his PCP which should be coming up.  No medication changes at this time.    Review Of Systems:     Constitutional Negative   ENT Negative   Cardiovascular Negative   Respiratory Negative   Gastrointestinal Negative   Genitourinary Negative   Musculoskeletal Negative   Integumentary Negative   Neurological Negative   Endocrine Negative     Past Medical History:   Patient Active Problem List   Diagnosis    Anxiety    Depression   Seizure history- No     Allergies:   No Known Allergies    Past Surgical History:   History reviewed. No pertinent surgical history.    Past Psychiatric History:   Past Inpatient Psychiatric Treatment:   No history of past inpatient psychiatric admissions  Past Outpatient Psychiatric Treatment:    [facility] through [facility]  Past Suicide Attempts: no  Past Violent Behavior: no  Past Psychiatric Medication Trials: Zoloft and Xanax    Family Psychiatric History:   Mom- Bipolar     Social History:   Lives " "with- Wife- 4 kids    Occupation- [employer]   Hobbies- Photography, fly fishing                                 Still enjoy (Anhedonia)- Still enjoys    Substance Abuse History:   Denies use of nicotine, tobacco, caffeine, marijuana, or other illicit drugs.     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    The following portions of the patient's history were reviewed and updated as appropriate: allergies, current medications, past family history, past medical history, past social history, past surgical history, and problem list.    Objective:  There were no vitals filed for this visit.      Weight (last 2 days)       None            Labs:   No results found for this or any previous visit.    Mental status:  Appearance sitting comfortably in chair, dressed in casual clothing, cooperative with interview, good eye contact   Mood \"Good\"     Affect Appears generally euthymic, stable, mood-congruent   Speech Normal rate, rhythm, and volume   Thought Processes Linear and goal directed   Associations intact associations   Hallucinations Denies any auditory or visual hallucinations   Thought Content No passive or active suicidal or homicidal ideation, intent, or plan.   Orientation Oriented to person, place, time, and situation   Recent and Remote Memory Grossly intact   Attention Span and Concentration Concentration intact   Intellect Appears to be of Average Intelligence   Insight Insight intact   Judgement judgment was intact   Muscle Strength Unable to assess due to virtual visit   Language Within normal limits   Fund of Knowledge Age appropriate   Pain None     PHQ-A Depression Screening                     Visit Time    Visit Start Time: 3:55 PM  Visit Stop Time: 4:05 PM  Total Visit Duration:  10 minutes    This note may have been written with the assistance of dictation software. Please excuse any grammatical  " errors, misspellings,  and abnormal spacing of letters, sentences or paragraphs .     GUIDO Carter  03/03/25

## 2025-03-03 ENCOUNTER — TELEMEDICINE (OUTPATIENT)
Dept: PSYCHIATRY | Facility: CLINIC | Age: 45
End: 2025-03-03
Payer: COMMERCIAL

## 2025-03-03 ENCOUNTER — TELEPHONE (OUTPATIENT)
Dept: PSYCHIATRY | Facility: CLINIC | Age: 45
End: 2025-03-03

## 2025-03-03 DIAGNOSIS — F41.9 ANXIETY: ICD-10-CM

## 2025-03-03 DIAGNOSIS — F32.A DEPRESSION, UNSPECIFIED DEPRESSION TYPE: ICD-10-CM

## 2025-03-03 PROCEDURE — 99214 OFFICE O/P EST MOD 30 MIN: CPT

## 2025-03-03 RX ORDER — CLONAZEPAM 0.5 MG/1
0.5 TABLET ORAL DAILY PRN
Qty: 30 TABLET | Refills: 2 | Status: SHIPPED | OUTPATIENT
Start: 2025-03-03

## 2025-03-03 RX ORDER — CITALOPRAM HYDROBROMIDE 40 MG/1
40 TABLET ORAL DAILY
Qty: 90 TABLET | Refills: 0 | Status: SHIPPED | OUTPATIENT
Start: 2025-03-03

## 2025-03-03 RX ORDER — QUETIAPINE FUMARATE 50 MG/1
50-100 TABLET, FILM COATED ORAL
Qty: 180 TABLET | Refills: 0 | Status: SHIPPED | OUTPATIENT
Start: 2025-03-03 | End: 2025-06-01

## 2025-03-03 NOTE — TELEPHONE ENCOUNTER
Writer called and left voicemail for client to schedule 3 month follow up with Tiff Lovell. Writer also let client know that yearly forms will be sent prior to that appointment.

## 2025-03-03 NOTE — ASSESSMENT & PLAN NOTE
-stable- continue medications as prescribed  Orders:    QUEtiapine (SEROquel) 50 mg tablet; Take 1-2 tablets ( mg total) by mouth daily at bedtime as needed (Daily at bedtimne as needed)    clonazePAM (KlonoPIN) 0.5 mg tablet; Take 1 tablet (0.5 mg total) by mouth daily as needed for anxiety    citalopram (CeleXA) 40 mg tablet; Take 1 tablet (40 mg total) by mouth daily

## 2025-03-03 NOTE — ASSESSMENT & PLAN NOTE
-stable- continue medications as prescribed  Orders:    QUEtiapine (SEROquel) 50 mg tablet; Take 1-2 tablets ( mg total) by mouth daily at bedtime as needed (Daily at bedtimne as needed)    citalopram (CeleXA) 40 mg tablet; Take 1 tablet (40 mg total) by mouth daily

## 2025-03-21 DIAGNOSIS — Z79.899 ENCOUNTER FOR LONG-TERM (CURRENT) USE OF MEDICATIONS: Primary | ICD-10-CM

## 2025-04-13 DIAGNOSIS — F41.9 ANXIETY: ICD-10-CM

## 2025-04-13 DIAGNOSIS — F32.A DEPRESSION, UNSPECIFIED DEPRESSION TYPE: ICD-10-CM

## 2025-04-14 RX ORDER — QUETIAPINE FUMARATE 50 MG/1
50-100 TABLET, FILM COATED ORAL
Qty: 180 TABLET | Refills: 0 | OUTPATIENT
Start: 2025-04-14 | End: 2025-07-13

## 2025-06-02 DIAGNOSIS — F41.9 ANXIETY: ICD-10-CM

## 2025-06-02 DIAGNOSIS — F32.A DEPRESSION, UNSPECIFIED DEPRESSION TYPE: ICD-10-CM

## 2025-06-03 DIAGNOSIS — F41.9 ANXIETY: ICD-10-CM

## 2025-06-03 DIAGNOSIS — F32.A DEPRESSION, UNSPECIFIED DEPRESSION TYPE: ICD-10-CM

## 2025-06-03 RX ORDER — QUETIAPINE FUMARATE 50 MG/1
50-100 TABLET, FILM COATED ORAL
Qty: 180 TABLET | Refills: 0 | OUTPATIENT
Start: 2025-06-03 | End: 2025-09-01

## 2025-06-03 RX ORDER — QUETIAPINE FUMARATE 50 MG/1
50-100 TABLET, FILM COATED ORAL
Qty: 180 TABLET | Refills: 0 | Status: SHIPPED | OUTPATIENT
Start: 2025-06-03 | End: 2025-09-01

## 2025-06-30 ENCOUNTER — TELEPHONE (OUTPATIENT)
Dept: PSYCHIATRY | Facility: CLINIC | Age: 45
End: 2025-06-30

## 2025-07-31 ENCOUNTER — TELEPHONE (OUTPATIENT)
Dept: PSYCHIATRY | Facility: CLINIC | Age: 45
End: 2025-07-31

## 2025-08-07 ENCOUNTER — TELEMEDICINE (OUTPATIENT)
Dept: PSYCHIATRY | Facility: CLINIC | Age: 45
End: 2025-08-07
Payer: COMMERCIAL

## 2025-08-07 DIAGNOSIS — F32.A DEPRESSION, UNSPECIFIED DEPRESSION TYPE: ICD-10-CM

## 2025-08-07 DIAGNOSIS — F41.9 ANXIETY: Primary | ICD-10-CM

## 2025-08-07 PROCEDURE — 99214 OFFICE O/P EST MOD 30 MIN: CPT

## 2025-08-07 RX ORDER — CITALOPRAM HYDROBROMIDE 40 MG/1
40 TABLET ORAL DAILY
Qty: 90 TABLET | Refills: 0 | Status: SHIPPED | OUTPATIENT
Start: 2025-08-07

## 2025-08-07 RX ORDER — CLONAZEPAM 0.5 MG/1
0.5 TABLET ORAL DAILY PRN
Qty: 30 TABLET | Refills: 2 | Status: SHIPPED | OUTPATIENT
Start: 2025-08-07

## 2025-08-07 RX ORDER — QUETIAPINE FUMARATE 50 MG/1
50-100 TABLET, FILM COATED ORAL
Qty: 180 TABLET | Refills: 0 | Status: SHIPPED | OUTPATIENT
Start: 2025-08-07 | End: 2025-11-05